# Patient Record
Sex: MALE | Race: BLACK OR AFRICAN AMERICAN
[De-identification: names, ages, dates, MRNs, and addresses within clinical notes are randomized per-mention and may not be internally consistent; named-entity substitution may affect disease eponyms.]

---

## 2018-06-05 ENCOUNTER — RX ONLY (OUTPATIENT)
Age: 53
Setting detail: RX ONLY
End: 2018-06-05

## 2018-06-05 RX ORDER — KETOCONAZOLE 20 MG/G
CREAM TOPICAL
Qty: 1 | Refills: 0 | COMMUNITY
Start: 2018-06-05

## 2018-07-10 ENCOUNTER — APPOINTMENT (RX ONLY)
Dept: URBAN - METROPOLITAN AREA CLINIC 132 | Facility: CLINIC | Age: 53
Setting detail: DERMATOLOGY
End: 2018-07-10

## 2018-07-10 DIAGNOSIS — L20.89 OTHER ATOPIC DERMATITIS: ICD-10-CM

## 2018-07-10 DIAGNOSIS — B35.4 TINEA CORPORIS: ICD-10-CM

## 2018-07-10 PROBLEM — L20.84 INTRINSIC (ALLERGIC) ECZEMA: Status: ACTIVE | Noted: 2018-07-10

## 2018-07-10 PROCEDURE — ? COUNSELING

## 2018-07-10 PROCEDURE — ? ADDITIONAL NOTES

## 2018-07-10 PROCEDURE — ? PRESCRIPTION

## 2018-07-10 PROCEDURE — 99213 OFFICE O/P EST LOW 20 MIN: CPT

## 2018-07-10 RX ORDER — TRIAMCINOLONE ACETONIDE 1 MG/G
CREAM TOPICAL BID
Qty: 1 | Refills: 1 | Status: ERX | COMMUNITY
Start: 2018-07-10

## 2018-07-10 RX ORDER — KETOCONAZOLE 20 MG/G
CREAM TOPICAL
Qty: 2 | Refills: 2 | Status: ERX

## 2018-07-10 RX ORDER — TERBINAFINE HCL 250 MG
TABLET ORAL
Qty: 14 | Refills: 0 | Status: ERX | COMMUNITY
Start: 2018-07-10

## 2018-07-10 RX ADMIN — Medication: at 18:54

## 2018-07-10 RX ADMIN — TRIAMCINOLONE ACETONIDE: 1 CREAM TOPICAL at 18:56

## 2018-07-10 ASSESSMENT — LOCATION SIMPLE DESCRIPTION DERM
LOCATION SIMPLE: LEFT THUMB
LOCATION SIMPLE: RIGHT THUMB

## 2018-07-10 ASSESSMENT — LOCATION DETAILED DESCRIPTION DERM
LOCATION DETAILED: DORSAL INTERPHALANGEAL JOINT RIGHT THUMB
LOCATION DETAILED: LEFT PROXIMAL ULNAR THUMB

## 2018-07-10 ASSESSMENT — LOCATION ZONE DERM: LOCATION ZONE: FINGER

## 2018-07-10 NOTE — PROCEDURE: ADDITIONAL NOTES
Additional Notes: Patient saw JOY Hillman in October and resolved with treatment but just came back.patient has no history of hepatitis or liver disease.
Additional Notes: If no improvement at follow up apt. Possible biopsy. Patient is self pay and aware of the plan.

## 2018-10-11 ENCOUNTER — RX ONLY (OUTPATIENT)
Age: 53
Setting detail: RX ONLY
End: 2018-10-11

## 2018-10-11 RX ORDER — TRIAMCINOLONE ACETONIDE 1 MG/G
CREAM TOPICAL
Qty: 1 | Refills: 0 | Status: ERX